# Patient Record
Sex: FEMALE | Race: WHITE | Employment: FULL TIME | URBAN - METROPOLITAN AREA
[De-identification: names, ages, dates, MRNs, and addresses within clinical notes are randomized per-mention and may not be internally consistent; named-entity substitution may affect disease eponyms.]

---

## 2018-09-02 ENCOUNTER — HOSPITAL ENCOUNTER (INPATIENT)
Age: 28
LOS: 2 days | Discharge: HOME | DRG: 544 | End: 2018-09-04
Attending: EMERGENCY MEDICINE | Admitting: INTERNAL MEDICINE
Payer: COMMERCIAL

## 2018-09-02 ENCOUNTER — APPOINTMENT (OUTPATIENT)
Dept: MRI IMAGING | Age: 28
DRG: 544 | End: 2018-09-02
Payer: COMMERCIAL

## 2018-09-02 DIAGNOSIS — S32.000A LUMBAR COMPRESSION FRACTURE, CLOSED, INITIAL ENCOUNTER (HCC): ICD-10-CM

## 2018-09-02 DIAGNOSIS — S32.010A CLOSED COMPRESSION FRACTURE OF FIRST LUMBAR VERTEBRA, INITIAL ENCOUNTER: Primary | ICD-10-CM

## 2018-09-02 PROBLEM — S32.020A CLOSED COMPRESSION FRACTURE OF L2 LUMBAR VERTEBRA: Status: ACTIVE | Noted: 2018-09-02

## 2018-09-02 LAB
ALBUMIN SERPL-MCNC: 3.9 G/DL (ref 3.5–5.2)
ALBUMIN/GLOBULIN RATIO: 1.3 (ref 1–2.5)
ALP BLD-CCNC: 62 U/L (ref 35–104)
ALT SERPL-CCNC: 24 U/L (ref 5–33)
ANION GAP SERPL CALCULATED.3IONS-SCNC: 14 MMOL/L (ref 9–17)
AST SERPL-CCNC: 24 U/L
BILIRUB SERPL-MCNC: 0.3 MG/DL (ref 0.3–1.2)
BUN BLDV-MCNC: 8 MG/DL (ref 6–20)
BUN/CREAT BLD: NORMAL (ref 9–20)
CALCIUM SERPL-MCNC: 8.9 MG/DL (ref 8.6–10.4)
CHLORIDE BLD-SCNC: 100 MMOL/L (ref 98–107)
CO2: 22 MMOL/L (ref 20–31)
CREAT SERPL-MCNC: 0.52 MG/DL (ref 0.5–0.9)
GFR AFRICAN AMERICAN: >60 ML/MIN
GFR NON-AFRICAN AMERICAN: >60 ML/MIN
GFR SERPL CREATININE-BSD FRML MDRD: NORMAL ML/MIN/{1.73_M2}
GFR SERPL CREATININE-BSD FRML MDRD: NORMAL ML/MIN/{1.73_M2}
GLUCOSE BLD-MCNC: 96 MG/DL (ref 70–99)
HCT VFR BLD CALC: 41.1 % (ref 36.3–47.1)
HEMOGLOBIN: 13.2 G/DL (ref 11.9–15.1)
MCH RBC QN AUTO: 30.3 PG (ref 25.2–33.5)
MCHC RBC AUTO-ENTMCNC: 32.1 G/DL (ref 28.4–34.8)
MCV RBC AUTO: 94.3 FL (ref 82.6–102.9)
NRBC AUTOMATED: 0 PER 100 WBC
PDW BLD-RTO: 12 % (ref 11.8–14.4)
PLATELET # BLD: 254 K/UL (ref 138–453)
PMV BLD AUTO: 9.5 FL (ref 8.1–13.5)
POTASSIUM SERPL-SCNC: 4.1 MMOL/L (ref 3.7–5.3)
RBC # BLD: 4.36 M/UL (ref 3.95–5.11)
SODIUM BLD-SCNC: 136 MMOL/L (ref 135–144)
TOTAL PROTEIN: 6.9 G/DL (ref 6.4–8.3)
WBC # BLD: 9.6 K/UL (ref 3.5–11.3)

## 2018-09-02 PROCEDURE — 72146 MRI CHEST SPINE W/O DYE: CPT

## 2018-09-02 PROCEDURE — 99285 EMERGENCY DEPT VISIT HI MDM: CPT

## 2018-09-02 PROCEDURE — 72148 MRI LUMBAR SPINE W/O DYE: CPT

## 2018-09-02 PROCEDURE — 80053 COMPREHEN METABOLIC PANEL: CPT

## 2018-09-02 PROCEDURE — 6370000000 HC RX 637 (ALT 250 FOR IP): Performed by: INTERNAL MEDICINE

## 2018-09-02 PROCEDURE — 36415 COLL VENOUS BLD VENIPUNCTURE: CPT

## 2018-09-02 PROCEDURE — 1200000000 HC SEMI PRIVATE

## 2018-09-02 PROCEDURE — 6360000002 HC RX W HCPCS: Performed by: EMERGENCY MEDICINE

## 2018-09-02 PROCEDURE — 85027 COMPLETE CBC AUTOMATED: CPT

## 2018-09-02 PROCEDURE — 2580000003 HC RX 258: Performed by: INTERNAL MEDICINE

## 2018-09-02 RX ORDER — SODIUM CHLORIDE 0.9 % (FLUSH) 0.9 %
10 SYRINGE (ML) INJECTION EVERY 12 HOURS SCHEDULED
Status: DISCONTINUED | OUTPATIENT
Start: 2018-09-02 | End: 2018-09-02

## 2018-09-02 RX ORDER — SODIUM CHLORIDE 9 MG/ML
INJECTION, SOLUTION INTRAVENOUS CONTINUOUS
Status: DISCONTINUED | OUTPATIENT
Start: 2018-09-02 | End: 2018-09-04 | Stop reason: HOSPADM

## 2018-09-02 RX ORDER — ONDANSETRON 4 MG/1
4 TABLET, FILM COATED ORAL ONCE
Status: COMPLETED | OUTPATIENT
Start: 2018-09-02 | End: 2018-09-02

## 2018-09-02 RX ORDER — HYDROCODONE BITARTRATE AND ACETAMINOPHEN 5; 325 MG/1; MG/1
1 TABLET ORAL EVERY 6 HOURS PRN
Status: DISCONTINUED | OUTPATIENT
Start: 2018-09-02 | End: 2018-09-03

## 2018-09-02 RX ORDER — ACETAMINOPHEN 325 MG/1
650 TABLET ORAL EVERY 4 HOURS PRN
Status: DISCONTINUED | OUTPATIENT
Start: 2018-09-02 | End: 2018-09-02

## 2018-09-02 RX ORDER — ONDANSETRON 2 MG/ML
4 INJECTION INTRAMUSCULAR; INTRAVENOUS EVERY 6 HOURS PRN
Status: DISCONTINUED | OUTPATIENT
Start: 2018-09-02 | End: 2018-09-04 | Stop reason: HOSPADM

## 2018-09-02 RX ORDER — ONDANSETRON 2 MG/ML
4 INJECTION INTRAMUSCULAR; INTRAVENOUS ONCE
Status: DISCONTINUED | OUTPATIENT
Start: 2018-09-02 | End: 2018-09-02

## 2018-09-02 RX ORDER — SODIUM CHLORIDE 0.9 % (FLUSH) 0.9 %
10 SYRINGE (ML) INJECTION EVERY 12 HOURS SCHEDULED
Status: DISCONTINUED | OUTPATIENT
Start: 2018-09-02 | End: 2018-09-04 | Stop reason: HOSPADM

## 2018-09-02 RX ORDER — SODIUM CHLORIDE 0.9 % (FLUSH) 0.9 %
10 SYRINGE (ML) INJECTION PRN
Status: DISCONTINUED | OUTPATIENT
Start: 2018-09-02 | End: 2018-09-04 | Stop reason: HOSPADM

## 2018-09-02 RX ORDER — SODIUM CHLORIDE 0.9 % (FLUSH) 0.9 %
10 SYRINGE (ML) INJECTION PRN
Status: DISCONTINUED | OUTPATIENT
Start: 2018-09-02 | End: 2018-09-02

## 2018-09-02 RX ADMIN — ACETAMINOPHEN 650 MG: 325 TABLET ORAL at 18:26

## 2018-09-02 RX ADMIN — Medication 10 ML: at 20:54

## 2018-09-02 RX ADMIN — SODIUM CHLORIDE: 9 INJECTION, SOLUTION INTRAVENOUS at 20:55

## 2018-09-02 RX ADMIN — ONDANSETRON HYDROCHLORIDE 4 MG: 4 TABLET, FILM COATED ORAL at 15:08

## 2018-09-02 ASSESSMENT — PAIN SCALES - GENERAL
PAINLEVEL_OUTOF10: 5
PAINLEVEL_OUTOF10: 6
PAINLEVEL_OUTOF10: 5
PAINLEVEL_OUTOF10: 4

## 2018-09-02 ASSESSMENT — PAIN DESCRIPTION - PROGRESSION: CLINICAL_PROGRESSION: NOT CHANGED

## 2018-09-02 ASSESSMENT — PAIN DESCRIPTION - PAIN TYPE
TYPE: ACUTE PAIN
TYPE: ACUTE PAIN

## 2018-09-02 ASSESSMENT — ENCOUNTER SYMPTOMS
RHINORRHEA: 0
EYE PAIN: 0
BACK PAIN: 1
VOMITING: 0
COUGH: 0
COLOR CHANGE: 0
NAUSEA: 0
SHORTNESS OF BREATH: 0
SORE THROAT: 0
ABDOMINAL PAIN: 0

## 2018-09-02 ASSESSMENT — PAIN DESCRIPTION - ORIENTATION
ORIENTATION: LOWER
ORIENTATION: LOWER

## 2018-09-02 ASSESSMENT — PAIN DESCRIPTION - LOCATION
LOCATION: BACK
LOCATION: BACK

## 2018-09-02 NOTE — ED NOTES
Bed: 22  Expected date:   Expected time:   Means of arrival:   Comments:  Krystyna García RN  09/02/18 8206

## 2018-09-02 NOTE — CONSULTS
Johanne  22.    Department of Neurosurgery  Resident Consult Note      Reason for Consult:  L1 compression fracture  Requesting Physician:  Rich Katz:   []Dr. Nilda Loya  []Dr. Pat Phoenix  []Dr. Monette Romberg  []Dr. Cristy Nguyen  []Dr. Hilda Andre  [x]Dr. Carmen Chimera    History Obtained From:  patient    CHIEF COMPLAINT:         Patient transferred here after being seen outlying facility and being found to have L1 compression fracture    HISTORY OF PRESENT ILLNESS:       The patient is a 32 y.o. female who presents with L1 compression fracture that occurred after patient fell backwards onto her buttocks and slid down 6 stairs. Patient denies any loss of consciousness does size any neck pain denies any neuro deficits at this time. Patient denies taking any blood thinners and admits the only medication she is taking is Benadryl, Imitrex, birth control. Patient had some mild lower midline back pain. PAST MEDICAL HISTORY :       Past Medical History:        Diagnosis Date    Migraine        Past Surgical History:    History reviewed. No pertinent surgical history. Social History:   Social History     Social History    Marital status: Single     Spouse name: N/A    Number of children: N/A    Years of education: N/A     Occupational History    Not on file. Social History Main Topics    Smoking status: Never Smoker    Smokeless tobacco: Never Used    Alcohol use Yes      Comment: Social    Drug use: No    Sexual activity: Not on file     Other Topics Concern    Not on file     Social History Narrative    No narrative on file       Family History:   History reviewed. No pertinent family history. Allergies:   Patient has no known allergies. Home Medications:  Prior to Admission medications    Not on File       Current Medications:   No current facility-administered medications for this encounter.      REVIEW OF SYSTEMS:       General ROS - No fevers, no chills  Ophthalmic ROS - No changes in vision from baseline  ENT ROS -  No sore throat, no rhinorrhea  Respiratory ROS - no cough, no shortness of breath  Cardiovascular ROS - No chest pain  Gastrointestinal ROS - No abdominal pain, no nausea, no vomiting  Genito-Urinary ROS - No dysuria  Musculoskeletal ROS - No neck pain, no back pain  Neurological ROS - No focal weakness or loss of sensation, no headache  Dermatological ROS - No lesions, no rash  Vascular/Lymphatic ROS - No edema    PHYSICAL EXAM:       Vitals:    09/02/18 1211   BP: (!) 159/92   Pulse: 82   Resp: 16   Temp: 97.7 °F (36.5 °C)   SpO2: 98%       CONSTITUTIONAL:  Well developed, well nourished, alert and oriented x 3, in no acute distress, nontoxic. No dysarthria, no aphasia. EOMI.     HEAD:  normocephalic, atraumatic    EYES:  PERRLA, EOMI   ENT:  moist mucous membranes, no stridor, no tracheal deviation   NECK:  no midline tenderness to palpation, no step-offs or deformities   BACK:  no midline tenderness to palpation, no step-offs or deformities    LUNGS:  CTAB, equal air entry bilaterally, no wheezes or rales   CARDIOVASCULAR:  RRR, no murmur   ABDOMEN:  Soft, no rigidity   NEUROLOGIC:  EYE OPENING     Spontaneous - 4 [x]       To voice - 3 []       To pain - 2 []       None - 1 []    VERBAL RESPONSE     Appropriate, oriented - 5 [x]       Dazed or confused - 4 []       Syllables, expletives - 3 []       Grunts - 2 []       None - 1 []    MOTOR RESPONSE     Spontaneous, command - 6 [x]       Localizes pain - 5 []       Withdraws pain - 4 []       Abnormal flexion - 3 []       Abnormal extension - 2 []       None - 1 []            Total GCS: 15    Mental Status:  A & O x3, awake             Cranial Nerves:    cranial nerves II-XII are grossly intact    Motor Exam:    Drift:  absent  Tone:  normal    Motor exam is symmetrical 5 out of 5 all extremities bilaterally    Sensory:    Touch:    Right Upper Extremity:  normal  Left Upper Extremity:  normal  Right Lower Extremity: normal  Left Lower Extremity:  normal      Plantar Response:    Right:  downgoing  Left:  downgoing    Clonus:  absent  Whitaker's:  absent      Finger to Nose:   Right:  normal  Left:  normal      SKIN:  no rash      LABS AND IMAGING:     Labs:  CBC with Differential:  No results found for: WBC, RBC, HGB, HCT, PLT, MCV, MCH, MCHC, RDW, NRBC, SEGSPCT, BANDSPCT, BLASTSPCT, METASPCT, LYMPHOPCT, PROMYELOPCT, MONOPCT, MYELOPCT, EOSPCT, BASOPCT, MONOSABS, LYMPHSABS, EOSABS, BASOSABS, DIFFTYPE  BMP:  No results found for: NA, K, CL, CO2, BUN, LABALBU, CREATININE, CALCIUM, GFRAA, LABGLOM, GLUCOSE    Radiology Review:    [facility] fracture imaging of chest and thoracic do not reveal any acute findings according to radiology reports. ASSESSMENT AND PLAN:       Patient Active Problem List   Diagnosis    Closed compression fracture of L1 lumbar vertebra [facility])       A/P:  Tiffanie Barton is a 32 y.o. female who presents with Acute L1 compression fracture after falling down stairs    Patient care will be discussed with attending, will reevaluate patient along with attending     - No neurosurgical interventions planned for now, further imaging with MRI to guide next steps. - CTLS recommendations: C spine clear TLS precautions  - F/U MRI Thoracic and Lumbar   - Neuro checks q4hrs  - Hold all antiplatelets and anticoagulants      Additional recommendations may follow    Please contact neurosurgery with any changes in patient's neurologic status. Thank you for your consult.        Yuko Lorenz DO    PGY-2 Emergency Medicine Resident Physician  Neurosurgery Team - pager [phone]  [facility]  9/2/2018 1:22 PM

## 2018-09-02 NOTE — CONSULTS
TRAUMA HISTORY AND PHYSICAL EXAMINATION    PATIENT NAME: Josefa Grimaldo  YOB: 1990  MEDICAL RECORD NO. [id number]   DATE: 9/2/2018  PRIMARY CARE PHYSICIAN: JUAN PABLO Jackson  PATIENT EVALUATED AT THE REQUEST OF : Claudia FUNK   []Trauma Alert     [] Trauma Priority     [x]Trauma Consult. IMPRESSION:     Patient Active Problem List   Diagnosis    Closed compression fracture of L1 lumbar vertebra [facility])       MEDICAL DECISION MAKING AND PLAN:       1. Admit to: Medsurg vs obs pending imaging  2. Neuro:  1. Pain management- per primary team  2. L1 acute compression fx - NS recs  1. MRI Lumbar/Thoracic today  3. CV  1. Hemodynamically stable  2. Continue to monitor  4. Pulm  1. Satting well on RA  5. GI/Nutrition  1. NPO until imaging complete    6. Musculoskeletal  1. C spine cleared per NS  2. TLS precautions - Clearance per NS    7. Family/dispo  1. Possibly d/c after imaging, NS recs    8. Images from [facility] facility  1. CT Chest/CT lumbar: Acute compression fx of L1 but no fracture fragments protruding into spinal canal    9. Trauma to s/o - please don't hesitate to call with questions or concerns        CONSULT SERVICES    [x] Neurosurgery     [] Orthopedic Surgery    [] Cardiothoracic     [] Facial Trauma    [] Plastic Surgery (Burn)    [] Pediatric Surgery     [] Internal Medicine    [] Pulmonary Medicine    [] Other:        HISTORY:     SOURCE OF INFORMATION  Patient information was obtained from patient and relative(s). History/Exam limitations: none. INJURY SUMMARY  L1 compression fracture    GENERAL DATA  Age 32 y.o.  female   Patient information was obtained from patient. History/Exam limitations: none. Patient presented to the Emergency Department by ambulance from [facility] ED where she received Morphine and dilaudid for pain control.   Injury Date: 9/2/2018   Approximate Injury Time: 7am        Transport mode:   [x]Ambulance      [] Helicopter     []Car       []

## 2018-09-02 NOTE — CARE COORDINATION
Case Management Initial Discharge Plan  [place],         Readmission Risk              Risk of Unplanned Readmission:        0               Met with:patient to discuss discharge plans. Information verified: address, contacts, phone number, , insurance No  PCP: JUAN PABLO Pena  Date of last visit: 2 weeks ago     Insurance Provider: yes     Discharge Planning    Living Arrangements:  Friends   Support Systems:  Family Members, Friends/Neighbors    Home has 2 stories  none stairs to climb to get into front door, up stairs to climb to reach second floor  Location of bedroom/bathroom in home up     Patient able to perform ADL's:Independent    Current Services (outpatient & in home) none  DME equipment: nonenone  DME provider: none    Pharmacy: [address] [facility] Medications:  Yes  Does patient want to participate in local refill/ meds to beds program?  Yes    Potential Assistance Needed:  Durable Medical Equipment, Outpatient PT/OT    Patient agreeable to home care: Yes  Freedom of choice provided:  yes    Prior SNF/Rehab Placement and Facility: no  Agreeable to SNF/Rehab: No  [place] of choice provided: n/a   Evaluation: n/a    Expected Discharge date:     Patient expects to be discharged to:  home  Follow Up Appointment: Best Day/ Time:      Transportation provider: miley   Transportation arrangements needed for discharge: No    Discharge Plan: Plan to discharge to home independently vs [facility] for PT. TABBY initiated, face sheet faxed.          Electronically signed by Jacquie Siemens, RN on 18 at 1:04 PM

## 2018-09-02 NOTE — H&P
[facility]    HISTORY AND PHYSICAL EXAMINATION            Date:   9/2/2018  Patient name:  Mary Jane Villarreal  Date of admission:  9/2/2018 12:09 PM  MRN:   [id number]  YOB: 1990    CHIEF COMPLAINT     Chief Complaint   Patient presents with    Fall     Pt is a transfer from [facility] s/p fall, dx with L1 compression fracture    Back Pain       HISTORY OF PRESENT ILLNESS      The patient is a 32 y.o.  female who is admitted to the hospital after a mechanical fall down her stairs this morning. She denies any prodromal symptoms including chest pain, lightheadedness, dizziness. She denies loss of consciousness or other such instances. She claims she fell on her sacral region and slid down the rest of her stairs. She was brought in and found to have a mild L1 compression fracture. Both trauma and neurosurgery were consulted from the ED. Patient was placed in CTLS and further imaging was ordered. She denies headaches, nausea, vomiting,difficulty with urination or change in bowel habits. Past medical history includes migraines. Denies other past medical history including DVT/PE. MI, stroke, DM or HTN. PAST MEDICAL HISTORY       has a past medical history of Migraine. PAST SURGICAL HISTORY       has no past surgical history on file. HOME MEDICATIONS        Prior to Admission medications    Not on File       ALLERGIES      Patient has no known allergies. SOCIAL HISTORY       reports that she has never smoked. She has never used smokeless tobacco. She reports that she drinks alcohol. She reports that she does not use drugs. FAMILY HISTORY      family history is not on file.     REVIEW OF SYSTEMS      · Constitutional: Negative for Fever, chills, headaches  · Cardiovascular: Negative for lightheadedness ,chest pain, palpitations   · Respiratory:Negative for Shortness of breath,cough or in the last 72 hours. S. Lactic Acid: No results for input(s): LACTA in the last 72 hours. Cardiac enzymes:No results for input(s): CKTOTAL, CKMB, CKMBINDEX, TROPONINI in the last 72 hours. BNP:No results for input(s): BNP in the last 72 hours. Lipid profile: No results for input(s): CHOL, TRIG, HDL, LDLCALC in the last 72 hours. Invalid input(s): LDL  Blood Gases: No results found for: PH, PCO2, PO2, HCO3, O2SAT  Thyroid functions: No results found for: TSH     MRI thoracic and lumbar  Lumbar spine:       Mild L1 compression fracture, without significant retropulsion into the   spinal canal or associated spinal canal stenosis.  Mild anterior vertebral   body height loss.       Mild multilevel degenerative changes at L3-L4, L4-5, L5-S1 as detailed above.       No additional injury within the lumbar spine.       Thoracic spine:       T2 hyperintense lesion within the central thoracic spinal cord extending from   T4 through T11.  This is most compatible with syrinx.  No evidence of spinal   cord contusion.  No evidence of a spinal cord compression.  Consider   nonemergent imaging of the cervical spine.       No convincing evidence of acute traumatic injury within the thoracic spine. ASSESSMENT     Active Problems:    Closed compression fracture of L1 lumbar vertebra (HCC)    Lumbar compression fracture, closed, initial encounter ([facility] [address]  Resolved Problems:    * No resolved hospital problems. *      PLAN      1. Await further neurosurgery and trauma recommendations  2. No AC/AP  3. Neurovascular checks q4h  4. TLS precautions per neurosurgery  5. DVT prophylaxis with EPC cuffs  6. Pain control  7.  DC planning-PT/OT/SW      Delphine Marques MD  PGY-3, Internal medicine resident  [place], [place]

## 2018-09-02 NOTE — ED PROVIDER NOTES
allergies. Home Medications:  Prior to Admission medications    Not on File       REVIEW OF SYSTEMS    (2-9 systems for level 4, 10 or more for level 5)      Review of Systems   Constitutional: Negative for chills and fever. HENT: Negative for rhinorrhea and sore throat. Eyes: Negative for pain and visual disturbance. Respiratory: Negative for cough and shortness of breath. Cardiovascular: Negative for chest pain and palpitations. Gastrointestinal: Negative for abdominal pain, nausea and vomiting. Genitourinary: Negative for difficulty urinating and dysuria. Musculoskeletal: Positive for back pain. Negative for arthralgias and myalgias. Skin: Negative for color change and wound. Neurological: Negative for weakness, numbness and headaches. Psychiatric/Behavioral: Negative for behavioral problems and dysphoric mood. PHYSICAL EXAM   (up to 7 for level 4, 8 or more for level 5)      INITIAL VITALS:   BP (!) 148/81   Pulse 68   Temp 97.7 °F (36.5 °C) (Oral)   Resp 16   Ht 5' 6\" (1.676 m)   Wt 300 lb (136.1 kg)   LMP 08/26/2018   SpO2 98%   BMI 48.42 kg/m²     Physical Exam   Constitutional: She is oriented to person, place, and time. She appears well-developed and well-nourished. No distress. HENT:   Head: Normocephalic and atraumatic. Mouth/Throat: Oropharynx is clear and moist.   Eyes: Pupils are equal, round, and reactive to light. EOM are normal.   Neck: Normal range of motion. No midline tenderness to palpation along the cervical spine; no step-offs or deformities felt   Cardiovascular: Normal rate and regular rhythm. Pulmonary/Chest: Effort normal. She has no wheezes. She has no rales. Abdominal: Soft. There is no tenderness. There is no rebound and no guarding. Neurological: She is alert and oriented to person, place, and time. She has normal strength. No cranial nerve deficit or sensory deficit. GCS eye subscore is 4. GCS verbal subscore is 5.  GCS motor subscore is 6.   No gross motor or sensory deficits   Skin: Skin is warm and dry. Psychiatric: Her behavior is normal.       DIFFERENTIAL  DIAGNOSIS     PLAN (LABS / IMAGING / EKG):  Orders Placed This Encounter   Procedures    MRI [address] Inpatient consult to Trauma Surgery    Inpatient consult to Neurosurgery    Inpatient consult to Internal Medicine    PATIENT STATUS (FROM ED OR OR/PROCEDURAL) Inpatient       MEDICATIONS ORDERED:  Orders Placed This Encounter   Medications    DISCONTD: ondansetron (ZOFRAN) injection 4 mg    ondansetron (ZOFRAN) tablet 4 mg       DIAGNOSTIC RESULTS / EMERGENCY DEPARTMENT COURSE / MDM     LABS:  No results found for this visit on 09/02/18. IMPRESSION: Patient presents as a transfer from an outlying facility and was found to have known L1 compression fracture. Patient afebrile and hemodynamically stable. She is nontoxic in appearance. She is neurologically intact with no gross motor sensory deficits. Given her presentation, we'll plan to touch base with neurosurgery and trauma services for further evaluation and plan for admission. RADIOLOGY:  No results found. EKG  None    All EKG's are interpreted by the Emergency Department Physician who either signs or Co-signs this chart in the absence of a cardiologist.    EMERGENCY DEPARTMENT COURSE:  Neurosurgery recommending MRI of the lumbar spine. Both trauma and neurosurgery have deferred admission. Internal medicine is agreeable with plan for admission. Patient's hemodynamically stable and awaiting transfer to the floor. PROCEDURES:  None    CONSULTS:  IP CONSULT TO TRAUMA SURGERY  IP CONSULT TO NEUROSURGERY  IP CONSULT TO INTERNAL MEDICINE    CRITICAL CARE:  None    FINAL IMPRESSION      1.  Closed compression fracture of first lumbar vertebra, initial encounter ([facility] [address]          DISPOSITION / PLAN     DISPOSITION Admitted 09/02/2018 03:09:19 PM      PATIENT REFERRED TO:  Jordan Johnson [place] [address] [id number]  [phone]            DISCHARGE MEDICATIONS:  New Prescriptions    No medications on file       Jeanne Delarosa MD  Emergency Medicine Resident    (Please note that portions of this note were completed with a voice recognition program.  Efforts were made to edit the dictations but occasionally words are mis-transcribed.)       Jeanne Delarosa MD  Resident  09/02/18 9246

## 2018-09-02 NOTE — ED NOTES
Neurosurgery at bedside     [facility]  09/02/18 9306

## 2018-09-03 ENCOUNTER — APPOINTMENT (OUTPATIENT)
Dept: GENERAL RADIOLOGY | Age: 28
DRG: 544 | End: 2018-09-03
Payer: COMMERCIAL

## 2018-09-03 PROBLEM — G43.909 MIGRAINE: Status: ACTIVE | Noted: 2018-09-03

## 2018-09-03 PROCEDURE — 1200000000 HC SEMI PRIVATE

## 2018-09-03 PROCEDURE — 99223 1ST HOSP IP/OBS HIGH 75: CPT | Performed by: INTERNAL MEDICINE

## 2018-09-03 PROCEDURE — 72100 X-RAY EXAM L-S SPINE 2/3 VWS: CPT

## 2018-09-03 PROCEDURE — 6370000000 HC RX 637 (ALT 250 FOR IP): Performed by: INTERNAL MEDICINE

## 2018-09-03 PROCEDURE — 94762 N-INVAS EAR/PLS OXIMTRY CONT: CPT

## 2018-09-03 PROCEDURE — 6370000000 HC RX 637 (ALT 250 FOR IP): Performed by: HOSPITALIST

## 2018-09-03 PROCEDURE — 2580000003 HC RX 258: Performed by: INTERNAL MEDICINE

## 2018-09-03 RX ORDER — AMITRIPTYLINE HYDROCHLORIDE 25 MG/1
25 TABLET, FILM COATED ORAL NIGHTLY
COMMUNITY
Start: 2018-08-23 | End: 2019-08-23

## 2018-09-03 RX ORDER — HYDROCODONE BITARTRATE AND ACETAMINOPHEN 5; 325 MG/1; MG/1
1 TABLET ORAL EVERY 4 HOURS PRN
Status: DISCONTINUED | OUTPATIENT
Start: 2018-09-03 | End: 2018-09-04 | Stop reason: HOSPADM

## 2018-09-03 RX ORDER — AMITRIPTYLINE HYDROCHLORIDE 25 MG/1
25 TABLET, FILM COATED ORAL NIGHTLY
Status: DISCONTINUED | OUTPATIENT
Start: 2018-09-03 | End: 2018-09-04 | Stop reason: HOSPADM

## 2018-09-03 RX ORDER — SUMATRIPTAN 50 MG/1
100 TABLET, FILM COATED ORAL PRN
Status: DISCONTINUED | OUTPATIENT
Start: 2018-09-03 | End: 2018-09-04 | Stop reason: HOSPADM

## 2018-09-03 RX ORDER — SUMATRIPTAN 100 MG/1
100 TABLET, FILM COATED ORAL PRN
COMMUNITY
Start: 2018-08-23 | End: 2019-08-23

## 2018-09-03 RX ADMIN — HYDROCODONE BITARTRATE AND ACETAMINOPHEN 1 TABLET: 5; 325 TABLET ORAL at 14:51

## 2018-09-03 RX ADMIN — HYDROCODONE BITARTRATE AND ACETAMINOPHEN 1 TABLET: 5; 325 TABLET ORAL at 22:56

## 2018-09-03 RX ADMIN — HYDROCODONE BITARTRATE AND ACETAMINOPHEN 1 TABLET: 5; 325 TABLET ORAL at 18:56

## 2018-09-03 RX ADMIN — AMITRIPTYLINE HYDROCHLORIDE 25 MG: 25 TABLET, FILM COATED ORAL at 01:21

## 2018-09-03 RX ADMIN — SUMATRIPTAN SUCCINATE 100 MG: 50 TABLET ORAL at 07:21

## 2018-09-03 RX ADMIN — Medication 10 ML: at 20:57

## 2018-09-03 RX ADMIN — HYDROCODONE BITARTRATE AND ACETAMINOPHEN 1 TABLET: 5; 325 TABLET ORAL at 10:26

## 2018-09-03 RX ADMIN — HYDROCODONE BITARTRATE AND ACETAMINOPHEN 1 TABLET: 5; 325 TABLET ORAL at 06:42

## 2018-09-03 RX ADMIN — SUMATRIPTAN SUCCINATE 100 MG: 50 TABLET ORAL at 01:21

## 2018-09-03 RX ADMIN — SODIUM CHLORIDE: 9 INJECTION, SOLUTION INTRAVENOUS at 08:18

## 2018-09-03 RX ADMIN — AMITRIPTYLINE HYDROCHLORIDE 25 MG: 25 TABLET, FILM COATED ORAL at 20:56

## 2018-09-03 RX ADMIN — HYDROCODONE BITARTRATE AND ACETAMINOPHEN 1 TABLET: 5; 325 TABLET ORAL at 00:03

## 2018-09-03 ASSESSMENT — PAIN SCALES - GENERAL
PAINLEVEL_OUTOF10: 5
PAINLEVEL_OUTOF10: 7
PAINLEVEL_OUTOF10: 6
PAINLEVEL_OUTOF10: 8
PAINLEVEL_OUTOF10: 7
PAINLEVEL_OUTOF10: 7
PAINLEVEL_OUTOF10: 8
PAINLEVEL_OUTOF10: 9
PAINLEVEL_OUTOF10: 7
PAINLEVEL_OUTOF10: 8

## 2018-09-03 ASSESSMENT — PAIN DESCRIPTION - LOCATION
LOCATION: BACK
LOCATION: BACK

## 2018-09-03 ASSESSMENT — PAIN DESCRIPTION - PAIN TYPE
TYPE: ACUTE PAIN
TYPE: ACUTE PAIN

## 2018-09-03 ASSESSMENT — PAIN DESCRIPTION - ORIENTATION
ORIENTATION: LOWER
ORIENTATION: LOWER

## 2018-09-03 NOTE — CARE COORDINATION
Care Transition    Called [facility] and informed her of new TLSO order. Faxed face sheet and TLSO brace order to [facility]. Called [facility] to confirm fax and said she has info and TLSO brace in on its way.

## 2018-09-03 NOTE — PROGRESS NOTES
Johanne  20.     Neurosurgery Service      Progress Note           Subjective :     No major events or new complaints through the night . Neurologically stable . No changes in mental status throughout the night . Slept well. A febrile . Hemodynamically stable . He complains of chronic recurrent migraine headaches but no dizziness . No vision changes . No numbness, tingling or weakness in upper or lower extremities. Tolerating fluids and diet well. No difficulty swallowing or vomiting . No problems with urination or bowel movements. No retention or incontinence . She continues to complain of lower back pain that  is well controlled with medications . Objective :     Vitals:    09/02/18 1645 09/02/18 1940 09/03/18 0540 09/03/18 0802   BP: 127/82 130/61 120/73 131/64   Pulse: 79 89 94 80   Resp: 16 18 15 18   Temp: 98.6 °F (37 °C) 97.8 °F (36.6 °C) 98.3 °F (36.8 °C) 97.8 °F (36.6 °C)   TempSrc: Oral Oral Oral Oral   SpO2:  98% 94% 94%   Weight: (!) 330 lb (149.7 kg)      Height: 5' 7\" (1.702 m)            Physical Examination :    Alert., Resting in bed, appears comfortable in no distress. obese  Head: normocephalic, atraumatic, facial features unremarkable   Eyes:  PERRLA +3, EOM intact. No nystagmus or ptosis. ENT: Unremarkable. Tongue midline   Neck Supple, Normal ROM. Trachea midline . No midline spine tenderness . No step off . No pain with axial loading. No meningeal signs. No Carotid Bruit   Lungs - Clear to auscultation. No crackles or wheezes. Cardiovascular - S1, S2, regular rate and rhythm. Abdomen - soft, non-distended, non-tender, no peritoneal inflammation signs  Back:  Diffuse mid to lower tenderness  Skin :  PWD. No open wounds , abrasions or contusions . No rash   Neuro-Muscular exam:  Awake and AOX4. Normal speech. Comprehension was normal. Follow commands. GCS 15/15. C2-12 Intact . PERRLA +3, EOM intact.   Coordination is normal. No involuntary movements lumbar spine. Thoracic spine: T2 hyperintense lesion within the central thoracic spinal cord extending from T4 through T11. This is most compatible with syrinx. No evidence of spinal cord contusion. No evidence of a spinal cord compression. Consider nonemergent imaging of the cervical spine. No convincing evidence of acute traumatic injury within the thoracic spine. Mri Lumbar Spine Wo Contrast    Result Date: 9/2/2018  EXAMINATION: MRI OF THE LUMBAR SPINE WITHOUT CONTRAST; MRI OF THE THORACIC SPINE WITHOUT CONTRAST, 9/2/2018 4:14 pm TECHNIQUE: Multiplanar multisequence MRI of the lumbar spine was performed without the administration of intravenous contrast.; Multiplanar multisequence MRI of the thoracic spine was performed without the administration of intravenous contrast. COMPARISON: None HISTORY: ORDERING SYSTEM PROVIDED HISTORY: L1 compression fracture FINDINGS: Thoracic spine: There is T2 hyperintense intramedullary signal within the thoracic spinal cord extending from the T4 vertebral body down to the T12 vertebral body. No convincing evidence of spinal cord compression. No evidence of epidural hematoma. The thoracic vertebral bodies are normal in size and signal intensity. No evidence of acute thoracic spine fracture. There is minimal disc height loss at T11-T12, without significant endplate destruction, or disc herniation. The remainder of the intervertebral discs are within normal limits. No significant degenerative changes within the thoracic spine. No evidence of significant spinal canal stenosis or neural foraminal stenosis within the thoracic spine. Lumbar spine: BONES/ALIGNMENT: There is abnormal marrow edema involving the L1 vertebral body with mild anterior vertebral body height loss. This involves the anterior and middle column. Minimal edema within the left pedicle.   No significant retropulsion into the spinal canal.  No evidence of associated epidural hematoma or spinal cord compression. The remainder of the lumbar vertebral bodies are within normal limits. No evidence of spondylolisthesis. No evidence of discitis or osteomyelitis. There are degenerative endplate changes at X2-5 on the right. No evidence of abnormal signal within the lumbar spine. SPINAL CORD: The conus terminates normally. SOFT TISSUES: No paraspinal mass identified. L1-L2: There is no significant disc herniation, spinal canal stenosis or neural foraminal narrowing. L2-L3: There is no significant disc herniation, spinal canal stenosis or neural foraminal narrowing. L3-L4: Broad disc bulge with central disc protrusion and annular fissure. Mild bilateral facet degenerative changes. No significant neural foraminal stenosis. No significant spinal canal stenosis. There is minimal intervertebral disc desiccation. L4-L5: Broad disc bulge, with central and right central disc protrusion. There is a small right central disc extrusion with inferior migration. There is annular fissure at this level. No significant spinal canal stenosis. Mild right lateral recess stenosis. No significant left lateral recess stenosis. No significant neural foraminal stenosis. There is minimal intervertebral disc desiccation. L5-S1: Mild broad disc bulge, with bilateral facet degenerative changes. No significant spinal canal stenosis. No significant neural foraminal stenosis. There is minimal intervertebral disc desiccation. Lumbar spine: Mild L1 compression fracture, without significant retropulsion into the spinal canal or associated spinal canal stenosis. Mild anterior vertebral body height loss. Mild multilevel degenerative changes at L3-L4, L4-5, L5-S1 as detailed above. No additional injury within the lumbar spine. Thoracic spine: T2 hyperintense lesion within the central thoracic spinal cord extending from T4 through T11. This is most compatible with syrinx. No evidence of spinal cord contusion.   No evidence of a spinal

## 2018-09-03 NOTE — PROGRESS NOTES
MG tablet Take 100 mg by mouth as needed Take 1 tablet at onset of headache. May repeat one time in two hours. Max x tablets per day 8/23/18 8/23/19 Yes Historical Provider, MD       ALLERGIES      Patient has no known allergies. SOCIAL HISTORY       reports that she has never smoked. She has never used smokeless tobacco. She reports that she drinks alcohol. She reports that she does not use drugs. FAMILY HISTORY      family history is not on file. REVIEW OF SYSTEMS      · Constitutional: Negative for Fever, chills, + headache  · Cardiovascular: Negative for lightheadedness ,chest pain, palpitations   · Respiratory:Negative for Shortness of breath,cough or wheezing. · Gastrointestinal: Negative for nausea/vomiting, change in bowel habits, abdominal pain  · Genitourinary: Negative for change in bladder habits, dysuria  · Musculoskeletal: Positive for lower back pain   · Neurological: Negative for headache, dizziness, change in muscle strength numbness/tingling  · Skin: no reported rashes    PHYSICAL EXAM      /64   Pulse 80   Temp 97.8 °F (36.6 °C) (Oral)   Resp 18   Ht 5' 7\" (1.702 m)   Wt (!) 330 lb (149.7 kg)   LMP 08/26/2018   SpO2 94%   BMI 51.69 kg/m²      · General appearance: well nourished, in no acute distress  · HEENT: Head: Normocephalic, no lesions, without obvious abnormality. · Lungs: clear to auscultation bilaterally  · Heart: regular rate and rhythm, S1, S2 normal, no murmur  · Abdomen: soft, non-tender; no masses,  no organomegaly  · Extremities: extremities normal, atraumatic, no cyanosis or edema  · Neurological:  No focal neurological deficits noted. Awake, alert, answering questions appropriately. · Psych-normal affect      DIAGNOSTICS      Laboratory Testing:  CBC:   Recent Labs      09/02/18 1928   WBC  9.6   HGB  13.2   PLT  254     BMP:    Recent Labs      09/02/18 1928   NA  136   K  4.1   CL  100   CO2  22   BUN  8   CREATININE  0.52   GLUCOSE  96     S. Calcium:  Recent Labs      09/02/18 1928   CALCIUM  8.9     S. Ionized Calcium:No results for input(s): IONCA in the last 72 hours. S. Magnesium:No results for input(s): MG in the last 72 hours. S. Phosphorus:No results for input(s): PHOS in the last 72 hours. S. Glucose:No results for input(s): POCGLU in the last 72 hours. Glycosylated hemoglobin A1C: No results for input(s): LABA1C in the last 72 hours. INR: No results for input(s): INR in the last 72 hours. Hepatic functions:   Recent Labs      09/02/18 1928   ALKPHOS  62   ALT  24   AST  24   PROT  6.9   BILITOT  0.30   LABALBU  3.9     Pancreatic functions:No results for input(s): LACTA, AMYLASE in the last 72 hours. S. Lactic Acid: No results for input(s): LACTA in the last 72 hours. Cardiac enzymes:No results for input(s): CKTOTAL, CKMB, CKMBINDEX, TROPONINI in the last 72 hours. BNP:No results for input(s): BNP in the last 72 hours. Lipid profile: No results for input(s): CHOL, TRIG, HDL, LDLCALC in the last 72 hours. Invalid input(s): LDL  Blood Gases: No results found for: PH, PCO2, PO2, HCO3, O2SAT  Thyroid functions: No results found for: TSH     Imaging/Diagonstics:    ASSESSMENT     Active Problems:    Closed compression fracture of L1 lumbar vertebra (HCC)    Lumbar compression fracture, closed, initial encounter ([facility] [address]    Migraine  Resolved Problems:    * No resolved hospital problems. *      PLAN      1. Await further neurosurgery and trauma recommendations  2. No AC/AP  3. Neurovascular checks q4h  4. TLS precautions per neurosurgery  5. Resume elavil and Imitrex PRN for migraine headaches  6. DVT prophylaxis with EPC cuffs  7. Pain control  8.  DC planning-PT/OT/GEE Wyatt MD  PGY-3, Internal medicine resident  [place], [place]

## 2018-09-04 VITALS
SYSTOLIC BLOOD PRESSURE: 134 MMHG | DIASTOLIC BLOOD PRESSURE: 80 MMHG | HEART RATE: 91 BPM | HEIGHT: 67 IN | WEIGHT: 293 LBS | TEMPERATURE: 98.2 F | OXYGEN SATURATION: 93 % | BODY MASS INDEX: 45.99 KG/M2 | RESPIRATION RATE: 18 BRPM

## 2018-09-04 PROCEDURE — 99239 HOSP IP/OBS DSCHRG MGMT >30: CPT | Performed by: INTERNAL MEDICINE

## 2018-09-04 PROCEDURE — 6370000000 HC RX 637 (ALT 250 FOR IP): Performed by: INTERNAL MEDICINE

## 2018-09-04 PROCEDURE — 93970 EXTREMITY STUDY: CPT

## 2018-09-04 PROCEDURE — G8978 MOBILITY CURRENT STATUS: HCPCS

## 2018-09-04 PROCEDURE — 97162 PT EVAL MOD COMPLEX 30 MIN: CPT

## 2018-09-04 PROCEDURE — G8979 MOBILITY GOAL STATUS: HCPCS

## 2018-09-04 PROCEDURE — 97530 THERAPEUTIC ACTIVITIES: CPT

## 2018-09-04 PROCEDURE — G8980 MOBILITY D/C STATUS: HCPCS

## 2018-09-04 RX ORDER — HYDROCODONE BITARTRATE AND ACETAMINOPHEN 5; 325 MG/1; MG/1
1 TABLET ORAL EVERY 6 HOURS PRN
Qty: 10 TABLET | Refills: 0 | Status: SHIPPED | OUTPATIENT
Start: 2018-09-04 | End: 2018-09-11

## 2018-09-04 RX ADMIN — HYDROCODONE BITARTRATE AND ACETAMINOPHEN 1 TABLET: 5; 325 TABLET ORAL at 16:53

## 2018-09-04 RX ADMIN — HYDROCODONE BITARTRATE AND ACETAMINOPHEN 1 TABLET: 5; 325 TABLET ORAL at 12:18

## 2018-09-04 RX ADMIN — HYDROCODONE BITARTRATE AND ACETAMINOPHEN 1 TABLET: 5; 325 TABLET ORAL at 07:56

## 2018-09-04 ASSESSMENT — PAIN DESCRIPTION - DESCRIPTORS: DESCRIPTORS: THROBBING

## 2018-09-04 ASSESSMENT — PAIN DESCRIPTION - ONSET: ONSET: ON-GOING

## 2018-09-04 ASSESSMENT — PAIN DESCRIPTION - LOCATION
LOCATION: BACK

## 2018-09-04 ASSESSMENT — PAIN SCALES - GENERAL
PAINLEVEL_OUTOF10: 5
PAINLEVEL_OUTOF10: 8
PAINLEVEL_OUTOF10: 7
PAINLEVEL_OUTOF10: 6
PAINLEVEL_OUTOF10: 6

## 2018-09-04 ASSESSMENT — PAIN DESCRIPTION - FREQUENCY: FREQUENCY: CONTINUOUS

## 2018-09-04 ASSESSMENT — PAIN DESCRIPTION - ORIENTATION
ORIENTATION: LOWER
ORIENTATION: LOWER

## 2018-09-04 ASSESSMENT — PAIN DESCRIPTION - PAIN TYPE
TYPE: ACUTE PAIN
TYPE: ACUTE PAIN

## 2018-09-04 ASSESSMENT — PAIN DESCRIPTION - PROGRESSION: CLINICAL_PROGRESSION: NOT CHANGED

## 2018-09-04 NOTE — FLOWSHEET NOTE
Patient complains of right calf pain/soreness. Patient says she noticed it started when she ambulated in [place] with physical therapy. Medicine resident notified per [employer]. Message acknowledge,doppler studies ordered.

## 2018-09-04 NOTE — PROGRESS NOTES
needed Take 1 tablet at onset of headache. May repeat one time in two hours. Max x tablets per day 8/23/18 8/23/19 Yes Historical Provider, MD       ALLERGIES      Patient has no known allergies. SOCIAL HISTORY       reports that she has never smoked. She has never used smokeless tobacco. She reports that she drinks alcohol. She reports that she does not use drugs. FAMILY HISTORY      family history is not on file. REVIEW OF SYSTEMS      · Constitutional: Negative for weight loss  · Cardiovascular: Negative for lightheadedness/orthostatic symptoms ,chest pain, dyspnea on exertion, palpitations or loss of consciousness. · Respiratory: Negative for cough or wheezing, sputum production, hemoptysis, pleuritic pain. · Gastrointestinal: Negative for nausea/vomiting, change in bowel habits, abdominal pain, dysphagia/appetite loss, hematemesis, blood in stools. · Musculoskeletal: Positive for lumbar back pain. Negative for gait disturbance, weakness, joint complaints. · Neurological: Negative for headache, dizziness, change in muscle strength, numbness/tingling, change in gait, balance, coordination,     PHYSICAL EXAM      /80   Pulse 91   Temp 98.2 °F (36.8 °C) (Oral)   Resp 18   Ht 5' 7\" (1.702 m)   Wt (!) 330 lb (149.7 kg)   LMP 08/26/2018   SpO2 93%   BMI 51.69 kg/m²      · General appearance: well nourished  · HEENT: Head: Normocephalic, no lesions, without obvious abnormality. · Lungs: clear to auscultation bilaterally  · Heart: regular rate and rhythm, S1, S2 normal, no murmur, click, rub or gallop  · Abdomen: soft, non-tender; bowel sounds normal; no masses,  no organomegaly  · Extremities: extremities normal, atraumatic, no cyanosis or edema  · Neurological: Alert and oriented ×3. Reflexes normal and symmetric.  Sensation grossly normal       DIAGNOSTICS      Laboratory Testing:  CBC:   Recent Labs      09/02/18   1928   WBC  9.6   HGB  13.2   PLT  254     BMP:    Recent Labs 09/02/18 1928   NA  136   K  4.1   CL  100   CO2  22   BUN  8   CREATININE  0.52   GLUCOSE  96     S. Calcium:  Recent Labs      09/02/18 1928   CALCIUM  8.9     S. Ionized Calcium:No results for input(s): IONCA in the last 72 hours. S. Magnesium:No results for input(s): MG in the last 72 hours. S. Phosphorus:No results for input(s): PHOS in the last 72 hours. S. Glucose:No results for input(s): POCGLU in the last 72 hours. Glycosylated hemoglobin A1C: No results for input(s): LABA1C in the last 72 hours. INR: No results for input(s): INR in the last 72 hours. Hepatic functions:   Recent Labs      09/02/18 1928   ALKPHOS  62   ALT  24   AST  24   PROT  6.9   BILITOT  0.30   LABALBU  3.9     Pancreatic functions:No results for input(s): LACTA, AMYLASE in the last 72 hours. S. Lactic Acid: No results for input(s): LACTA in the last 72 hours. Cardiac enzymes:No results for input(s): CKTOTAL, CKMB, CKMBINDEX, TROPONINI in the last 72 hours. BNP:No results for input(s): BNP in the last 72 hours. Lipid profile: No results for input(s): CHOL, TRIG, HDL, LDLCALC in the last 72 hours. Invalid input(s): LDL  Blood Gases: No results found for: PH, PCO2, PO2, HCO3, O2SAT  Thyroid functions: No results found for: TSH     Imaging/Diagonstics:      CXR: No acute cardiopulmonary findings. ASSESSMENT     Patient Active Problem List   Diagnosis    Closed compression fracture of L1 lumbar vertebra (HCC)    Lumbar compression fracture, closed, initial encounter ([facility] [address]    Migraine       PLAN      1. Compression fracture of L1 lumbar vertebra. Continue with lumbar brace. No surgical interventions from neurosurgery at this point. Patient will be following up with neurosurgery as outpatient  2. Elavil and Imitrex when necessary for migraine headaches resumed. 3. DVT prophylaxis with EPC  4. Pain control  5. Obtain venous doppler of LE to rule out DVT  6.  Discharge planning: Likely discharge home today if doppler

## 2018-09-04 NOTE — FLOWSHEET NOTE
Discharge instructions reviewed with patient and family. Informed that walker will be delivered to their house tonight. Meds to beds to deliver prescription. Patient and family verbalize awareness of plans for home physical and occupational therapy Patient and family verbalize understanding. Back brace applied for discharge home.

## 2018-09-04 NOTE — PLAN OF CARE
Lumbar XRs with brace in place show unchanged compression fracture. Good alignment. No further neurosurgical intervention required. Pt to follow-up outpt.       Kim Bunch MD

## 2018-09-04 NOTE — PROGRESS NOTES
Smoking Cessation - topics covered   []  Health Risks  []  Benefits of Quitting   []  Smoking Cessation  [x]  Patient has no history of tobacco use  []  Patient is former smoker. [x]  No need for tobacco cessation education. []  Booklet given  []  Patient verbalizes understanding. []  Patient denies need for tobacco cessation education.   Valente Bryant  8:16 AM

## 2018-09-11 NOTE — DISCHARGE SUMMARY
[facility]   Department of Internal Medicine - Staff Internal Medicine Service    INPATIENT DISCHARGE SUMMARY      PATIENT IDENTIFICATION:  NAME: Zabrina Oshea : 1990 MRN: [id number]  ACCT: [de-identified]     Admit Date: 2018  Discharge date: 2018  6:52 PM     Attending Provider: No att. providers found                                     Dictating Provider: Jake Madison MD  ______________________________________________________________________________    REASON FOR HOSPITALIZATION:     Active Problems:    Closed compression fracture of L1 lumbar vertebra (HCC)    [facility]    Migraine    Syrinx of spinal cord ([facility]  Resolved Problems:    * No resolved hospital problems. *        HOSPITAL COURSE AND TREATMENT:  The patient is a 32 y.o.  female who is admitted to the hospitalwho is admitted to the hospital after a mechanical fall down her stairs this morning. She denies any prodromal symptoms including chest pain, lightheadedness, dizziness. She denies loss of consciousness or other such instances. She claims she fell on her sacral region and slid down the rest of her stairs.     She was brought in and found to have a mild L1 compression fracture. Both trauma and neurosurgery were consulted from the ED. Patient was placed in CTLS and further imaging was ordered. She denies headaches, nausea, vomiting,difficulty with urination or change in bowel habits.     Past medical history includes migraines. Denies other past medical history including DVT/PE. MI, stroke, DM or HTN. Patient was admitted for compression fracture of L1 lumbar vertebrae. Patient was seen by neurosurgery. No surgical intervention was recommended. Patient was given a lumbar brace and was advised to follow-up with neurosurgery as outpatient.      Consults: Neurosurgery    Procedures:  None    Any Hospital Acquired Infections: None      PATIENT'S

## 2018-10-10 ENCOUNTER — OFFICE VISIT (OUTPATIENT)
Dept: NEUROSURGERY | Age: 28
End: 2018-10-10
Payer: COMMERCIAL

## 2018-10-10 VITALS
WEIGHT: 293 LBS | SYSTOLIC BLOOD PRESSURE: 119 MMHG | BODY MASS INDEX: 44.41 KG/M2 | HEART RATE: 112 BPM | HEIGHT: 68 IN | DIASTOLIC BLOOD PRESSURE: 84 MMHG

## 2018-10-10 DIAGNOSIS — S32.010D CLOSED WEDGE COMPRESSION FRACTURE OF FIRST LUMBAR VERTEBRA WITH ROUTINE HEALING, SUBSEQUENT ENCOUNTER: Primary | ICD-10-CM

## 2018-10-10 PROCEDURE — 99212 OFFICE O/P EST SF 10 MIN: CPT | Performed by: NEUROLOGICAL SURGERY

## 2018-10-10 RX ORDER — APIXABAN 5 MG/1
2 TABLET, FILM COATED ORAL DAILY
COMMUNITY
Start: 2018-10-06 | End: 2019-06-03 | Stop reason: ALTCHOICE

## 2018-10-10 NOTE — PROGRESS NOTES
[address]  [facility] [address] [id number]  Dept: [phone]    Patient:  Lakesha Milligan  YOB: 1990  Date: 10/10/18    The patient is a 32 y.o. female who presents today for consult of the following problems:     Chief Complaint   Patient presents with    Back Pain             HPI:     Lakesha Milligan is a 32 y.o. female on whom neurosurgical consultation was requested by JUAN PABLO Villela for management of Lumbar compression fracture. Patient does have a moderate amount of mid axial back pain worsen upon ambulating and mobilizing. Improved upon laying down. The reading numbness tingling or weakness. She has been off work for approximately last 5 weeks since the injury. Nonradiating achy mid back pain ranges from 4-7 out of 10 worsened with mobilizing and ambulating and improved with lying down and over-the-counter medications. No associated symptoms. .      History:     Past Medical History:   Diagnosis Date    Migraine      No past surgical history on file. No family history on file. Current Outpatient Prescriptions on File Prior to Visit   Medication Sig Dispense Refill    amitriptyline (ELAVIL) 25 MG tablet Take 25 mg by mouth nightly      SUMAtriptan (IMITREX) 100 MG tablet Take 100 mg by mouth as needed Take 1 tablet at onset of headache. May repeat one time in two hours. Max x tablets per day       No current facility-administered medications on file prior to visit. Social History   Substance Use Topics    Smoking status: Never Smoker    Smokeless tobacco: Never Used    Alcohol use Yes      Comment: Social       No Known Allergies    Review of Systems  Constitutional: Negative for activity change and appetite change. HENT: Negative for ear pain and facial swelling. Eyes: Negative for discharge and itching. Respiratory: Negative for choking and chest tightness.     Cardiovascular: Negative for chest pain and leg swelling. Gastrointestinal: Negative for nausea and abdominal pain. Endocrine: Negative for cold intolerance and heat intolerance. Genitourinary: Negative for frequency and flank pain. Musculoskeletal: Negative for myalgias and joint swelling. Skin: Negative for rash and wound. Allergic/Immunologic: Negative for environmental allergies and food allergies. Hematological: Negative for adenopathy. Does not bruise/bleed easily. Psychiatric/Behavioral: Negative for self-injury. The patient is not nervous/anxious. Physical Exam:      /84 (Site: Right Upper Arm, Position: Sitting, Cuff Size: Large Adult)   Pulse 112   Ht 5' 8\" (1.727 m)   Wt (!) 337 lb (152.9 kg)   BMI 51.24 kg/m²   Estimated body mass index is 51.24 kg/m² as calculated from the following:    Height as of this encounter: 5' 8\" (1.727 m). Weight as of this encounter: 337 lb (152.9 kg). General:  Maria L Chatman is a 32y.o. year old female who appears her stated age. HEENT: Normocephalic atraumatic. Neck supple. Chest: regular rate; pulses equal  Abdomen: Soft nontender nondistended. Normoactive bowel sounds. Neurologic Exam awake alert oriented ×3 cranial nerves II 12 are intact. 5 out of 5 bilateral upper and lower extremities sensation intact light touch reflexes 2 out of 4 no Bia's or clonus present. Studies Review:     X-rays reveal stable L1 compression fracture with no progressive loss of height or angulation. Assessment and Plan:      1. Closed wedge compression fracture of first lumbar vertebra with routine healing, subsequent encounter          Plan: Instructed patient to wean the brace over the next 2 weeks. We will give her 2 weeks off work at that time we can reassess. Followup: No Follow-up on file. Prescriptions Ordered:  No orders of the defined types were placed in this encounter. Orders Placed:  No orders of the defined types were placed in this encounter.

## 2018-10-24 ENCOUNTER — OFFICE VISIT (OUTPATIENT)
Dept: NEUROSURGERY | Age: 28
End: 2018-10-24
Payer: COMMERCIAL

## 2018-10-24 VITALS
WEIGHT: 293 LBS | SYSTOLIC BLOOD PRESSURE: 159 MMHG | HEIGHT: 68 IN | HEART RATE: 106 BPM | DIASTOLIC BLOOD PRESSURE: 99 MMHG | BODY MASS INDEX: 44.41 KG/M2

## 2018-10-24 DIAGNOSIS — S32.010D CLOSED WEDGE COMPRESSION FRACTURE OF FIRST LUMBAR VERTEBRA WITH ROUTINE HEALING, SUBSEQUENT ENCOUNTER: Primary | ICD-10-CM

## 2018-10-24 PROCEDURE — 99213 OFFICE O/P EST LOW 20 MIN: CPT | Performed by: PHYSICIAN ASSISTANT

## 2018-10-24 NOTE — PROGRESS NOTES
[address], [facility] [address]  [address] [place] [address]  Dept: [phone]    Patient:  Pernell Vasquez  YOB: 1990  Date: 10/24/18    The patient is a 25 y.o. female who presents today for followup of the following problems:     Chief Complaint   Patient presents with    Follow-up     S/P fall, L1 compression fracture        HPI:     Patient returns today in f/u to recent appointment 10/10/18 with Dr Magdiel Torres regarding management of L1 compression fracture sustained after a fall down some stairs on 9/2/18. Since her last appointment patient has discontinued TLSO brace as instructed. She reports that her back pain has completely resolved over the last 2 weeks. She denies numbness, tingling, bowel or bladder incontinence, weakness, saddle anesthesia, or gait disturbance. She would like to return to work. History:     Past Medical History:   Diagnosis Date    Migraine      History reviewed. No pertinent surgical history. History reviewed. No pertinent family history. Current Outpatient Prescriptions on File Prior to Visit   Medication Sig Dispense Refill    ELIQUIS 5 MG TABS tablet Take 2 tablets by mouth Daily      amitriptyline (ELAVIL) 25 MG tablet Take 25 mg by mouth nightly      SUMAtriptan (IMITREX) 100 MG tablet Take 100 mg by mouth as needed Take 1 tablet at onset of headache. May repeat one time in two hours. Max x tablets per day       No current facility-administered medications on file prior to visit. Social History   Substance Use Topics    Smoking status: Never Smoker    Smokeless tobacco: Never Used    Alcohol use Yes      Comment: Social       No Known Allergies    Review of Systems  Constitutional: Negative for activity change and appetite change. HENT: Negative for ear pain and facial swelling. Eyes: Negative for discharge and itching. Respiratory: Negative for choking and chest tightness. height loss   at T11-T12, without significant endplate destruction, or disc herniation. The   remainder of the intervertebral discs are within normal limits.       There is T2 hyperintense intramedullary lesion within thoracic spinal cord   extending from the T4 vertebral body down to the T12 vertebral body.  No   convincing evidence of spinal cord compression.  No evidence of epidural   hematoma.       No significant degenerative changes within the thoracic spine.  No evidence   of significant spinal canal stenosis or neural foraminal stenosis within the   thoracic spine.       LUMBAR SPINE:       BONES/ALIGNMENT:       There is abnormal marrow edema involving the L1 vertebral body with mild   anterior vertebral body height loss.  This involves the anterior and middle   column.  Minimal edema within the left pedicle.  No significant retropulsion   into the spinal canal.  No evidence of associated epidural hematoma or spinal   cord compression.       The remainder of the lumbar vertebral bodies are within normal limits.  No   evidence of spondylolisthesis.  No evidence of discitis or osteomyelitis. There are degenerative endplate changes at A8-8 on the right.  No evidence of   abnormal signal within the lumbar spine.       SPINAL CORD:       The conus terminates normally.       SOFT TISSUES:       No paraspinal mass identified.       L1-L2: There is no significant disc herniation, spinal canal stenosis or   neural foraminal narrowing.       L2-L3: There is no significant disc herniation, spinal canal stenosis or   neural foraminal narrowing.       L3-L4: Broad disc bulge with central disc protrusion and annular fissure. Mild bilateral facet degenerative changes. No significant neural foraminal   stenosis. No significant spinal canal stenosis. There is minimal   intervertebral disc desiccation.       L4-L5: Broad disc bulge, with central and right central disc protrusion.    There is small right central disc encounter      Plan: This is a 26 yo female with L1 compression fracture sustained after falling down 6 stairs on 9/2/18. Since her last appointment with Dr Krystle Pappas on 10/10/18 she has weaned herself from her TLSO brace over 2 week period as instructed. She returns today for reassessment and possibly release to return to work. Patient presently denies back pain or neurological symptoms. Her physical examination reveals no deficit. She may return to her job and previous activities as tolerated. Followup: Return if symptoms worsen or fail to improve. Prescriptions Ordered:  No orders of the defined types were placed in this encounter. Orders Placed:  No orders of the defined types were placed in this encounter. Electronically signed by JUAN PABLO Padilla on 12/1/2018 at 4:31 PM    Please note that this chart was generated using voice recognition Dragon dictation software. Although every effort was made to ensure the accuracy of this automated transcription, some errors in transcription may have occurred.

## 2019-05-01 ENCOUNTER — OFFICE VISIT (OUTPATIENT)
Dept: NEUROSURGERY | Age: 29
End: 2019-05-01
Payer: COMMERCIAL

## 2019-05-01 VITALS
SYSTOLIC BLOOD PRESSURE: 139 MMHG | WEIGHT: 293 LBS | DIASTOLIC BLOOD PRESSURE: 94 MMHG | HEART RATE: 89 BPM | BODY MASS INDEX: 54.13 KG/M2

## 2019-05-01 DIAGNOSIS — S32.010D CLOSED WEDGE COMPRESSION FRACTURE OF FIRST LUMBAR VERTEBRA WITH ROUTINE HEALING, SUBSEQUENT ENCOUNTER: ICD-10-CM

## 2019-05-01 DIAGNOSIS — R26.81 GAIT INSTABILITY: ICD-10-CM

## 2019-05-01 DIAGNOSIS — R29.6 FREQUENT FALLS: ICD-10-CM

## 2019-05-01 DIAGNOSIS — G95.0 SYRINX OF SPINAL CORD (HCC): Primary | ICD-10-CM

## 2019-05-01 PROCEDURE — 99214 OFFICE O/P EST MOD 30 MIN: CPT | Performed by: PHYSICIAN ASSISTANT

## 2019-05-01 NOTE — PROGRESS NOTES
Migraine      History reviewed. No pertinent surgical history. History reviewed. No pertinent family history. Current Outpatient Medications on File Prior to Visit   Medication Sig Dispense Refill    ELIQUIS 5 MG TABS tablet Take 2 tablets by mouth Daily      amitriptyline (ELAVIL) 25 MG tablet Take 25 mg by mouth nightly      SUMAtriptan (IMITREX) 100 MG tablet Take 100 mg by mouth as needed Take 1 tablet at onset of headache. May repeat one time in two hours. Max x tablets per day       No current facility-administered medications on file prior to visit. Social History     Tobacco Use    Smoking status: Never Smoker    Smokeless tobacco: Never Used   Substance Use Topics    Alcohol use: Yes     Comment: Social    Drug use: No       No Known Allergies    Review of Systems  Constitutional: Negative for activity change and appetite change. HEENT: Negative for ear pain and facial swelling. Eyes: Negative for discharge and itching. Respiratory: Negative for choking and chest tightness. Cardiovascular: Negative for chest pain and leg swelling. Gastrointestinal: Negative for nausea and abdominal pain. Endocrine: Negative for cold intolerance and heat intolerance. Genitourinary: Negative for frequency and flank pain. Musculoskeletal: Negative for myalgias and joint swelling. Skin: Negative for rash and wound. Allergic/Immunologic: Negative for environmental allergies and food allergies. Hematological: Negative for adenopathy. Does not bruise/bleed easily. Psychiatric/Behavioral: Negative for self-injury. The patient is not nervous/anxious. Physical Exam:      BP (!) 139/94 (Site: Right Lower Arm, Position: Sitting, Cuff Size: Large Adult)   Pulse 89   Wt (!) 356 lb (161.5 kg)   BMI 54.13 kg/m²   Estimated body mass index is 54.13 kg/m² as calculated from the following:    Height as of 10/24/18: 5' 8\" (1.727 m). Weight as of this encounter: 356 lb (161.5 kg).     General: Alfredo Samuels is a 25y.o. year old female who appears her stated age. HEENT: Normocephalic atraumatic. Neck supple. Chest: Clear to auscultation bilaterally. Regular rate and rhythm. Abdomen: Soft nontender nondistended. Normoactive bowel sounds. Neurological Exam  Alert and oriented x 3. CN II-XII intact. Motor examination:   Strength in right upper extremity at +4/5 deltoid, +5/5 triceps, biceps, wrist ext/flex, and . Strength in left upper extremity at +4/5 deltoid, +5/5 triceps, biceps, wrist ext/flex, and . Strength right lower extremity at 5/5 iliopsoas, quadriceps, hamstring, tibialis anterior, gastrocnemius, and EHL. Strength left lower extremity at 5/5 iliopsoas, quadriceps, hamstring, tibialis anterior, gastrocnemius, and EHL. Sensory: Sensory deficit bilateral lateral aspect calves  Reflexes: +2/4 in bilateral upper extremities. +2/4 in bilateral patellar and achilles. Hoffmans negative, no clonus. Gait: Normal, narrow based. Studies Review:     Reviewed MRI Type:  Film and Report    Images:  Narrative   EXAMINATION:   MRI OF THE LUMBAR SPINE WITHOUT CONTRAST; MRI OF THE THORACIC SPINE WITHOUT   CONTRAST, 9/2/2018 4:14 pm       TECHNIQUE:   Multiplanar multisequence MRI of the lumbar spine was performed without the   administration of intravenous contrast.; Multiplanar multisequence MRI of the   thoracic spine was performed without the administration of intravenous   contrast.       COMPARISON:   None       HISTORY:   ORDERING SYSTEM PROVIDED HISTORY: L1 compression fracture       FINDINGS:   THORACIC SPINE:       The thoracic vertebral bodies are normal in size and signal intensity. No   evidence of acute thoracic spine fracture.  There is minimal disc height loss   at T11-T12, without significant endplate destruction, or disc herniation.  The   remainder of the intervertebral discs are within normal limits.       There is T2 hyperintense intramedullary lesion within thoracic spinal cord   extending from the T4 vertebral body down to the T12 vertebral body.  No   convincing evidence of spinal cord compression.  No evidence of epidural   hematoma.       No significant degenerative changes within the thoracic spine.  No evidence   of significant spinal canal stenosis or neural foraminal stenosis within the   thoracic spine.       LUMBAR SPINE:       BONES/ALIGNMENT:       There is abnormal marrow edema involving the L1 vertebral body with mild   anterior vertebral body height loss.  This involves the anterior and middle   column.  Minimal edema within the left pedicle.  No significant retropulsion   into the spinal canal.  No evidence of associated epidural hematoma or spinal   cord compression.       The remainder of the lumbar vertebral bodies are within normal limits.  No   evidence of spondylolisthesis.  No evidence of discitis or osteomyelitis. There are degenerative endplate changes at B5-5 on the right.  No evidence of   abnormal signal within the lumbar spine.       SPINAL CORD:       The conus terminates normally.       SOFT TISSUES:       No paraspinal mass identified.       L1-L2: There is no significant disc herniation, spinal canal stenosis or   neural foraminal narrowing.       L2-L3: There is no significant disc herniation, spinal canal stenosis or   neural foraminal narrowing.       L3-L4: Broad disc bulge with central disc protrusion and annular fissure. Mild bilateral facet degenerative changes. No significant neural foraminal   stenosis. No significant spinal canal stenosis. There is minimal   intervertebral disc desiccation.       L4-L5: Broad disc bulge, with central and right central disc protrusion. There is small right central disc extrusion with inferior migration. There is   annular fissure at this level. No significant spinal canal stenosis. Mild   right lateral recess stenosis. No significant left lateral recess stenosis.    No significant neural foraminal stenosis. Wilhelmena Rasher is minimal intervertebral   disc desiccation.       L5-S1: Mild broad disc bulge, with bilateral facet degenerative changes.  No   significant spinal canal stenosis.  No significant neural foraminal stenosis. There is minimal intervertebral disc desiccation.           Impression   LUMBAR SPINE:       1. Mild L1 compression fracture, without significant retropulsion into the   spinal canal or associated spinal canal stenosis.  Mild anterior vertebral   body height loss.       2. Mild multilevel degenerative changes at L3-L4, L4-5, L5-S1 as detailed   above.       3. No additional injury within the lumbar spine.       THORACIC SPINE:       1. T2 hyperintense lesion within central thoracic spinal cord extending from   T4 through T12. No evidence of a spinal cord compression. This is most   compatible with syrinx.  Consider non emergent MRI cervical spine, and   post-contrast MRI thoracic spine to exclude potential etiologies of syrinx.       2. No convincing evidence of acute traumatic injury within the thoracic spine.       RECOMMENDATIONS:   Recommend non-emergent MRI Cervical spine, and post-contrast MRI thoracic   spine to exclude potential etiologies of syrinx.         Assessment and Plan:     Ronit Foote was seen today for numbness and follow-up. Diagnoses and all orders for this visit:    Syrinx of spinal cord ([facility] [address]  -     MRI [address]; Future  -     MRI Cervical Spine W WO Contrast; Future    Gait instability  -     MRI THORACIC SPINE W WO CONTRAST; Future  -     MRI Cervical Spine W WO Contrast; Future  -     XR LUMBAR SPINE (2-3 VIEWS); Future    Frequent falls  -     MRI THORACIC SPINE W WO CONTRAST; Future  -     MRI Cervical Spine W WO Contrast; Future    Closed wedge compression fracture of first lumbar vertebra with routine healing, subsequent encounter  -     XR LUMBAR SPINE (2-3 VIEWS);  Future        Plan:   Ms. Anisha Boateng is a 25 y.o. female being seen in the neurosurgery clinic today complaining of gait disturbance and numbness/tingling distal bilateral lower extremities of acute onset about one week ago. No inciting trauma or event symptoms. Past medical history significant for traumatic L1 compression fracture after falling on the stairs and 9/2018. Treatment was immobilization in a TLSO brace ×6 weeks. Patient has since recovered completely and returned to work. There was an incidental  finding of syrinx in thoracic cord extending from T4 through T12 on MRI thoracic spine completed on 9/2/2018. MRI thoracic and cervical spine with contrast is indicated based on clinical presentation, previous MRI findings, and radiology recommendation for the purpose of more complete evaluation intra spinal contents, syrinx,  and r/o impending or existing spinal cord or nerve root compression secondary to other structural or anatomical abnormalities. XR lumbar spine to evaluate status L1 fracture. Patient is planning on having her imaging done at [facility]. She is advised to provide copies of imaging loaded into PACS system for full viewing prior to her appointment. Diagnosis and plan discussed with the patient and all questions answered. Followup: Return in about 2 weeks (around 5/15/2019) for with Dr Neema Mercado, syrin thoracic spinal core. Prescriptions Ordered:  No orders of the defined types were placed in this encounter.        Orders Placed:  Orders Placed This Encounter   Procedures    MRI THORACIC SPINE W WO CONTRAST     Standing Status:   Future     Standing Expiration Date:   5/1/2020    MRI Cervical Spine W WO Contrast     Standing Status:   Future     Number of Occurrences:   1     Standing Expiration Date:   7/30/2019    XR LUMBAR SPINE (2-3 VIEWS)     Standing Status:   Future     Number of Occurrences:   1     Standing Expiration Date:   5/1/2020     Order Specific Question:   Reason for exam:     Answer:   L1 compression fracture

## 2019-05-09 DIAGNOSIS — R26.81 GAIT INSTABILITY: ICD-10-CM

## 2019-05-09 DIAGNOSIS — R29.6 FREQUENT FALLS: ICD-10-CM

## 2019-05-09 DIAGNOSIS — G95.0 SYRINX OF SPINAL CORD (HCC): ICD-10-CM

## 2019-05-13 ENCOUNTER — TELEPHONE (OUTPATIENT)
Dept: NEUROSURGERY | Age: 29
End: 2019-05-13

## 2019-05-13 DIAGNOSIS — S32.010D CLOSED WEDGE COMPRESSION FRACTURE OF FIRST LUMBAR VERTEBRA WITH ROUTINE HEALING, SUBSEQUENT ENCOUNTER: ICD-10-CM

## 2019-05-13 DIAGNOSIS — R26.81 GAIT INSTABILITY: ICD-10-CM

## 2019-05-13 NOTE — TELEPHONE ENCOUNTER
Pt called requesting results of her MRI (5/9) and her XR (5/2). MRI results are in epic but no imaging and no report for XR. Called and spoke with Radiology at [facility] and they are faxing over the XR results and mailing the imaging. Will document when information arrives. Once information arrives, please review and advise.

## 2019-05-17 ENCOUNTER — TELEPHONE (OUTPATIENT)
Dept: NEUROSURGERY | Age: 29
End: 2019-05-17

## 2019-06-03 ENCOUNTER — OFFICE VISIT (OUTPATIENT)
Dept: NEUROSURGERY | Age: 29
End: 2019-06-03
Payer: COMMERCIAL

## 2019-06-03 VITALS
BODY MASS INDEX: 54.59 KG/M2 | HEART RATE: 97 BPM | DIASTOLIC BLOOD PRESSURE: 86 MMHG | WEIGHT: 293 LBS | SYSTOLIC BLOOD PRESSURE: 122 MMHG

## 2019-06-03 DIAGNOSIS — G89.29 CHRONIC MIDLINE LOW BACK PAIN WITHOUT SCIATICA: ICD-10-CM

## 2019-06-03 DIAGNOSIS — S32.010D CLOSED COMPRESSION FRACTURE OF L1 LUMBAR VERTEBRA WITH ROUTINE HEALING, SUBSEQUENT ENCOUNTER: Primary | ICD-10-CM

## 2019-06-03 DIAGNOSIS — M54.50 CHRONIC MIDLINE LOW BACK PAIN WITHOUT SCIATICA: ICD-10-CM

## 2019-06-03 PROCEDURE — 99213 OFFICE O/P EST LOW 20 MIN: CPT | Performed by: NEUROLOGICAL SURGERY

## 2019-06-03 NOTE — PROGRESS NOTES
[place] [address]  [address] [id number]  Dept: [phone]    Patient:  Valentina Fine  YOB: 1990  Date: 6/3/19    The patient is a 25 y.o. female who presents today for consult of the following problems:     Chief Complaint   Patient presents with    Back Pain     lumbar region, discuss MRI results             HPI:     Valentina Fine is a 25 y.o. female on whom neurosurgical consultation was requested by JUAN PABLO Mittal for management of lumbar axial back pain. The patient sustained a compression fracture in September of last year. Since that time she was maintained in a brace with slow weaning and serial x-rays. The patient has healed from a radiographic standpoint and has no lower extremity deficits. Her gait issues have also resolved spontaneously. She mainly has midline nonradiating achy back pain ranging anywhere from 6-8 out of 10 worsened with standing and handling for long periods. No associated numbness tingling weakness saddle anesthesia bowel or bladder issues. History:     Past Medical History:   Diagnosis Date    Migraine      No past surgical history on file. No family history on file. Current Outpatient Medications on File Prior to Visit   Medication Sig Dispense Refill    diphenhydrAMINE HCl (BENADRYL ALLERGY PO) Take by mouth      amitriptyline (ELAVIL) 25 MG tablet Take 25 mg by mouth nightly      SUMAtriptan (IMITREX) 100 MG tablet Take 100 mg by mouth as needed Take 1 tablet at onset of headache. May repeat one time in two hours. Max x tablets per day       No current facility-administered medications on file prior to visit.       Social History     Tobacco Use    Smoking status: Never Smoker    Smokeless tobacco: Never Used   Substance Use Topics    Alcohol use: Yes     Comment: Social    Drug use: No       No Known Allergies    Review of Systems  Constitutional: Negative for activity 5/5; R intrinsics 5/5     L iliopsoas 5/5 , R iliopsoas 5/5  L quadriceps 5/5; R quadriceps 5/5  L Dorsiflexion 5/5; R dorsiflexion 5/5  L Plantarflexion 5/5; R plantarflexion 5/5  L EHL 5/5; R EHL 5/5    Reflexes  L Brachioradialis 2+/4; R brachioradialis 2+/4  L Biceps 2+/4; R Biceps 2+/4  L Triceps 2+/4; R Triceps 2+/4  L Patellar 2+/4: R Patellar 2+/4  L Achilles 2+/4; R Achilles 2+/4    hoffmans L: neg  hoffmans R: neg  Clonus L: neg  Clonus R: neg  Babinski L: up  Babinski R; up    Negative Rosy negative straight leg raise  Studies Review:     X-rays 1 month ago show stable alignment with no progressive loss of height    Assessment and Plan:      1. Closed compression fracture of L1 lumbar vertebra with routine healing, subsequent encounter    2. Chronic midline low back pain without sciatica          Plan: The patient needs conservative measures including maximizing physical therapy for core conditioning strengthening including weight loss. I will refer her to [facility] for full evaluation and management of therapy    Followup: No follow-ups on file. Prescriptions Ordered:  No orders of the defined types were placed in this encounter. Orders Placed:  Orders Placed This Encounter   Procedures   Shay Samayoa MD, Physical Medicine and Rehabilitation, [facility]     Referral Priority:   Routine     Referral Type:   Eval and Treat     Referral Reason:   Specialty Services Required     Referred to Provider:   Ronda Rouse MD     Requested Specialty:   Physical Medicine and Rehab     Number of Visits Requested:   1        Electronically signed by Homer Deng DO on 6/3/2019 at 11:20 AM    Please note that this chart was generated using voice recognition Dragon dictation software. Although every effort was made to ensure the accuracy of this automated transcription, some errors in transcription may have occurred.

## 2024-01-11 ENCOUNTER — HOSPITAL ENCOUNTER (OUTPATIENT)
Dept: GENERAL RADIOLOGY | Age: 34
Discharge: HOME | End: 2024-01-13
Payer: COMMERCIAL

## 2024-01-11 ENCOUNTER — OFFICE VISIT (OUTPATIENT)
Dept: PRIMARY CARE CLINIC | Age: 34
End: 2024-01-11
Payer: COMMERCIAL

## 2024-01-11 VITALS
TEMPERATURE: 98.7 F | HEIGHT: 67 IN | SYSTOLIC BLOOD PRESSURE: 134 MMHG | RESPIRATION RATE: 15 BRPM | WEIGHT: 293 LBS | DIASTOLIC BLOOD PRESSURE: 84 MMHG | HEART RATE: 93 BPM | BODY MASS INDEX: 45.99 KG/M2 | OXYGEN SATURATION: 96 %

## 2024-01-11 DIAGNOSIS — M25.561 ACUTE PAIN OF RIGHT KNEE: ICD-10-CM

## 2024-01-11 DIAGNOSIS — S83.91XA SPRAIN OF RIGHT KNEE, UNSPECIFIED LIGAMENT, INITIAL ENCOUNTER: Primary | ICD-10-CM

## 2024-01-11 PROCEDURE — 73562 X-RAY EXAM OF KNEE 3: CPT

## 2024-01-11 PROCEDURE — 99203 OFFICE O/P NEW LOW 30 MIN: CPT

## 2024-01-11 PROCEDURE — 99212 OFFICE O/P EST SF 10 MIN: CPT

## 2024-01-11 RX ORDER — DICYCLOMINE HCL 20 MG
20 TABLET ORAL 3 TIMES DAILY PRN
COMMUNITY
Start: 2022-12-16

## 2024-01-11 RX ORDER — ALBUTEROL SULFATE 90 UG/1
2 AEROSOL, METERED RESPIRATORY (INHALATION) EVERY 4 HOURS PRN
COMMUNITY
Start: 2020-07-27

## 2024-01-11 ASSESSMENT — ENCOUNTER SYMPTOMS: COLOR CHANGE: 1

## 2024-01-11 NOTE — PROGRESS NOTES
01/11/24 0824   BP: 134/84   Site: Left Upper Arm   Position: Sitting   Cuff Size: Large Adult   Pulse: 93   Resp: 15   Temp: 98.7 °F (37.1 °C)   TempSrc: Tympanic   SpO2: 96%   Weight: (!) 169.2 kg (373 lb)   Height: 1.702 m (5' 7\")     Body mass index is 58.42 kg/m².    /84 (Site: Left Upper Arm, Position: Sitting, Cuff Size: Large Adult)   Pulse 93   Temp 98.7 °F (37.1 °C) (Tympanic)   Resp 15   Ht 1.702 m (5' 7\")   Wt (!) 169.2 kg (373 lb)   LMP 01/09/2024   SpO2 96%   BMI 58.42 kg/m²   Physical Exam  Vitals reviewed.   Constitutional:       General: She is not in acute distress.  HENT:      Head: Normocephalic.   Eyes:      Extraocular Movements: Extraocular movements intact.      Pupils: Pupils are equal, round, and reactive to light.   Cardiovascular:      Rate and Rhythm: Normal rate and regular rhythm.      Heart sounds: No murmur heard.  Pulmonary:      Effort: Pulmonary effort is normal.      Breath sounds: Normal breath sounds.   Musculoskeletal:         General: No swelling.      Cervical back: Neck supple.      Right knee: Tenderness present over the medial joint line and MCL.      Left knee: Normal.        Legs:       Comments: Ecchymosis and swelling to right knee medial aspect    Neurological:      General: No focal deficit present.      Mental Status: She is alert and oriented to person, place, and time.   Psychiatric:         Mood and Affect: Mood normal.         Behavior: Behavior normal.         Assessment and Plan      Diagnosis Orders   1. Sprain of right knee, unspecified ligament, initial encounter  Kieran Goss PA, Orthopedic Surgery, [place]      2. Acute pain of right knee  XR KNEE RIGHT (3 VIEWS)        Orders Placed This Encounter                     XR KNEE RIGHT (3 VIEWS)    Result Date: 1/11/2024  EXAMINATION: THREE XRAY VIEWS OF THE RIGHT KNEE 1/11/2024 8:44 am COMPARISON: None. HISTORY: ORDERING SYSTEM PROVIDED HISTORY: Acute pain of right knee

## 2024-01-11 NOTE — PATIENT INSTRUCTIONS
Please follow PRICE protocol  Protect  Rest  Ice  Compression  Elevation  Rotate tylenol/ibuprofen for pain/inflammation  Follow up with ortho

## 2024-01-25 ENCOUNTER — OFFICE VISIT (OUTPATIENT)
Dept: ORTHOPEDIC SURGERY | Age: 34
End: 2024-01-25
Payer: COMMERCIAL

## 2024-01-25 VITALS
HEART RATE: 73 BPM | BODY MASS INDEX: 45.99 KG/M2 | WEIGHT: 293 LBS | HEIGHT: 67 IN | DIASTOLIC BLOOD PRESSURE: 80 MMHG | SYSTOLIC BLOOD PRESSURE: 133 MMHG

## 2024-01-25 DIAGNOSIS — S86.911A KNEE STRAIN, RIGHT, INITIAL ENCOUNTER: Primary | ICD-10-CM

## 2024-01-25 PROCEDURE — 99202 OFFICE O/P NEW SF 15 MIN: CPT | Performed by: NURSE PRACTITIONER

## 2024-01-25 RX ORDER — LISINOPRIL AND HYDROCHLOROTHIAZIDE 25; 20 MG/1; MG/1
1 TABLET ORAL DAILY
COMMUNITY
Start: 2023-01-03

## 2024-01-25 RX ORDER — MELOXICAM 7.5 MG/1
7.5 TABLET ORAL DAILY
Qty: 30 TABLET | Refills: 3 | Status: SHIPPED | OUTPATIENT
Start: 2024-01-25

## 2024-01-26 NOTE — PROGRESS NOTES
pain.      PHYSICAL EXAM:  [unfilled]  General appearance:  Alert and oriented x 3. No apparent distress, appears stated age, calm and cooperative.   Musculoskeletal: Right lower extremity was examined.  Skin is intact no rashes lesions or drainage.  No redness warmth or cellulitis.  Mild knee joint effusion.  Denies calf pain to palpation.  Good stability to varus and valgus stress testing.  No crepitus noted with range of motion of the knee.  Toe flexion and extension is intact.  Neurovascularly intact sensation intact to foot.  Patient does have pain to palpation over the medial and lateral joint lines.  Negative Lachemann's. negative Sandor's.       DATA:  CBC:   Lab Results   Component Value Date/Time    WBC 9.6 09/02/2018 07:28 PM    HGB 13.2 09/02/2018 07:28 PM     09/02/2018 07:28 PM     BMP:    Lab Results   Component Value Date/Time     09/02/2018 07:28 PM    K 4.1 09/02/2018 07:28 PM     09/02/2018 07:28 PM    CO2 22 09/02/2018 07:28 PM    BUN 8 09/02/2018 07:28 PM    CREATININE 0.52 09/02/2018 07:28 PM    CALCIUM 8.9 09/02/2018 07:28 PM    GLUCOSE 96 09/02/2018 07:28 PM     PT/INR:  No results found for: \"PROTIME\", \"INR\"  Troponin:  No results found for: \"TROPONINI\"  No results for input(s): \"LIPASE\", \"AMYLASE\" in the last 72 hours.  No results for input(s): \"AST\", \"ALT\", \"BILIDIR\", \"BILITOT\", \"ALKPHOS\" in the last 72 hours.  Uric Acid:  No components found for: \"URIC\"  Urine Culture:  No components found for: \"CURINE\"    Radiology:   XR KNEE RIGHT (3 VIEWS)    Result Date: 1/11/2024  EXAMINATION: THREE XRAY VIEWS OF THE RIGHT KNEE 1/11/2024 8:44 am COMPARISON: None. HISTORY: ORDERING SYSTEM PROVIDED HISTORY: Acute pain of right knee TECHNOLOGIST PROVIDED HISTORY: right anterior  knee pain s/p fall x 2 days ago Reason for Exam: RT knee pain, fell FINDINGS: No fracture or malalignment identified.  The joint spaces are maintained. Suspect small effusion.     No acute osseous

## 2024-02-12 ENCOUNTER — TELEPHONE (OUTPATIENT)
Dept: ORTHOPEDIC SURGERY | Age: 34
End: 2024-02-12

## 2024-02-12 NOTE — TELEPHONE ENCOUNTER
Patient stated her knee is doing better and would like to have her restrictions lifted for work. Is it okay to change restrictions.

## 2024-02-22 ENCOUNTER — OFFICE VISIT (OUTPATIENT)
Dept: ORTHOPEDIC SURGERY | Age: 34
End: 2024-02-22
Payer: COMMERCIAL

## 2024-02-22 VITALS — BODY MASS INDEX: 45.99 KG/M2 | HEIGHT: 67 IN | WEIGHT: 293 LBS

## 2024-02-22 DIAGNOSIS — S86.911A KNEE STRAIN, RIGHT, INITIAL ENCOUNTER: Primary | ICD-10-CM

## 2024-02-22 DIAGNOSIS — M25.561 ACUTE PAIN OF RIGHT KNEE: ICD-10-CM

## 2024-02-22 PROCEDURE — 20610 DRAIN/INJ JOINT/BURSA W/O US: CPT | Performed by: NURSE PRACTITIONER

## 2024-02-22 PROCEDURE — 99212 OFFICE O/P EST SF 10 MIN: CPT | Performed by: NURSE PRACTITIONER

## 2024-02-22 RX ORDER — BUPIVACAINE HYDROCHLORIDE 5 MG/ML
5 INJECTION, SOLUTION PERINEURAL ONCE
Status: COMPLETED | OUTPATIENT
Start: 2024-02-22 | End: 2024-02-22

## 2024-02-22 RX ORDER — TRIAMCINOLONE ACETONIDE 40 MG/ML
80 INJECTION, SUSPENSION INTRA-ARTICULAR; INTRAMUSCULAR ONCE
Status: COMPLETED | OUTPATIENT
Start: 2024-02-22 | End: 2024-02-22

## 2024-02-22 RX ADMIN — TRIAMCINOLONE ACETONIDE 80 MG: 200 INJECTION, SUSPENSION INTRA-ARTICULAR; INTRAMUSCULAR at 09:05

## 2024-02-22 RX ADMIN — BUPIVACAINE HYDROCHLORIDE 25 MG: 5 INJECTION, SOLUTION PERINEURAL at 09:05

## 2024-02-22 NOTE — PROGRESS NOTES
Orthopaedic Progress Note      CHIEF COMPLAINT: Right knee pain    HISTORY OF PRESENT ILLNESS:       Ms. Barnes  is a 33 y.o. female  who presents with right knee pain.  Patient was last seen back on January 25 where she was evaluated for a Workmen's Comp. injury.  She had a fall at her place of work landing on her knee in January.  Her BMI is 58.42.  She has been having continued pain to her right knee without relief.  She has been taking Tylenol.  She was also given Mobic at her last visit which has been helpful.  She was preapproved by Workmen's [employer]. for a corticosteroid injection into her right knee today.      Past Medical History:    Past Medical History:   Diagnosis Date    Migraine        Past Surgical History:    History reviewed. No pertinent surgical history.      Current  Medications:  Current Outpatient Medications   Medication Sig Dispense Refill    lisinopril-hydroCHLOROthiazide (PRINZIDE;ZESTORETIC) 20-25 MG per tablet Take 1 tablet by mouth daily      apixaban (ELIQUIS) 5 MG TABS tablet Take by mouth 2 times daily      meloxicam (MOBIC) 7.5 MG tablet Take 1 tablet by mouth daily 30 tablet 3    albuterol sulfate HFA (PROVENTIL;VENTOLIN;PROAIR) 108 (90 Base) MCG/ACT inhaler Inhale 2 puffs into the lungs every 4 hours as needed      amitriptyline (ELAVIL) 25 MG tablet Take 25 mg by mouth nightly       Current Facility-Administered Medications   Medication Dose Route Frequency Provider Last Rate Last Admin    BUPivacaine (MARCAINE) 0.5 % injection 25 mg  5 mL Intra-artICUlar Once Denisse Epperson APRN - CNP        triamcinolone acetonide (KENALOG-40) injection 80 mg  80 mg Intra-artICUlar Once Denisse Epperson APRN - CNP           Allergies:  Patient has no known allergies.    Social History:   Social History     Tobacco Use   Smoking Status Never   Smokeless Tobacco Never     Social History     Substance and Sexual Activity   Alcohol Use Yes    Comment: Social     Social History

## 2025-02-07 NOTE — TELEPHONE ENCOUNTER
I can only see report presently but I will review imaging after it is scanned into PACs.
I spoke with pt on 5/17 and informed her that her results were normal. Her disc is still up here if you want to view the imaging. Please review and advise.
Patient called to ask if there was anything pertaining to her Xray and MRI that could be explaining her pain. Please let me know what to relay to the patient.
Please review pt imaging as she is concerned. Results finalized in her imaging section. Thank you.
see mdm